# Patient Record
Sex: MALE | Race: WHITE | NOT HISPANIC OR LATINO | Employment: UNEMPLOYED | URBAN - METROPOLITAN AREA
[De-identification: names, ages, dates, MRNs, and addresses within clinical notes are randomized per-mention and may not be internally consistent; named-entity substitution may affect disease eponyms.]

---

## 2019-09-08 ENCOUNTER — HOSPITAL ENCOUNTER (EMERGENCY)
Facility: OTHER | Age: 24
Discharge: HOME | End: 2019-09-08
Attending: EMERGENCY MEDICINE
Payer: COMMERCIAL

## 2019-09-08 VITALS
OXYGEN SATURATION: 100 % | HEIGHT: 69 IN | HEART RATE: 102 BPM | TEMPERATURE: 98 F | DIASTOLIC BLOOD PRESSURE: 76 MMHG | BODY MASS INDEX: 21.48 KG/M2 | WEIGHT: 145 LBS | SYSTOLIC BLOOD PRESSURE: 134 MMHG | RESPIRATION RATE: 15 BRPM

## 2019-09-08 DIAGNOSIS — R07.9 CHEST PAIN: ICD-10-CM

## 2019-09-08 DIAGNOSIS — B34.9 VIRAL SYNDROME: ICD-10-CM

## 2019-09-08 DIAGNOSIS — R11.2 NON-INTRACTABLE VOMITING WITH NAUSEA, UNSPECIFIED VOMITING TYPE: Primary | ICD-10-CM

## 2019-09-08 LAB
BACTERIA #/AREA URNS HPF: NORMAL /HPF
BILIRUB UR QL STRIP: ABNORMAL
CLARITY UR: CLEAR
COLOR UR: YELLOW
GLUCOSE UR QL STRIP: NEGATIVE
HGB UR QL STRIP: NEGATIVE
HYALINE CASTS #/AREA URNS LPF: 0 /LPF
KETONES UR QL STRIP: ABNORMAL
LEUKOCYTE ESTERASE UR QL STRIP: NEGATIVE
MICROSCOPIC COMMENT: NORMAL
NITRITE UR QL STRIP: NEGATIVE
PH UR STRIP: 8 [PH] (ref 5–8)
PROT UR QL STRIP: ABNORMAL
RBC #/AREA URNS HPF: 2 /HPF (ref 0–4)
SP GR UR STRIP: 1.01 (ref 1–1.03)
URN SPEC COLLECT METH UR: ABNORMAL
UROBILINOGEN UR STRIP-ACNC: ABNORMAL EU/DL
WBC #/AREA URNS HPF: 1 /HPF (ref 0–5)

## 2019-09-08 PROCEDURE — 63600175 PHARM REV CODE 636 W HCPCS: Performed by: EMERGENCY MEDICINE

## 2019-09-08 PROCEDURE — 81000 URINALYSIS NONAUTO W/SCOPE: CPT

## 2019-09-08 PROCEDURE — 96374 THER/PROPH/DIAG INJ IV PUSH: CPT

## 2019-09-08 PROCEDURE — 93010 EKG 12-LEAD: ICD-10-PCS | Mod: ,,, | Performed by: INTERNAL MEDICINE

## 2019-09-08 PROCEDURE — 93010 ELECTROCARDIOGRAM REPORT: CPT | Mod: ,,, | Performed by: INTERNAL MEDICINE

## 2019-09-08 PROCEDURE — 96361 HYDRATE IV INFUSION ADD-ON: CPT

## 2019-09-08 PROCEDURE — 93005 ELECTROCARDIOGRAM TRACING: CPT

## 2019-09-08 PROCEDURE — 99284 EMERGENCY DEPT VISIT MOD MDM: CPT | Mod: 25

## 2019-09-08 PROCEDURE — 25000003 PHARM REV CODE 250: Performed by: EMERGENCY MEDICINE

## 2019-09-08 RX ORDER — KETOROLAC TROMETHAMINE 30 MG/ML
15 INJECTION, SOLUTION INTRAMUSCULAR; INTRAVENOUS
Status: COMPLETED | OUTPATIENT
Start: 2019-09-08 | End: 2019-09-08

## 2019-09-08 RX ORDER — ACETAMINOPHEN 500 MG
1000 TABLET ORAL
Status: COMPLETED | OUTPATIENT
Start: 2019-09-08 | End: 2019-09-08

## 2019-09-08 RX ORDER — ONDANSETRON 8 MG/1
8 TABLET, ORALLY DISINTEGRATING ORAL
Status: COMPLETED | OUTPATIENT
Start: 2019-09-08 | End: 2019-09-08

## 2019-09-08 RX ADMIN — ONDANSETRON 8 MG: 8 TABLET, ORALLY DISINTEGRATING ORAL at 05:09

## 2019-09-08 RX ADMIN — KETOROLAC TROMETHAMINE 15 MG: 30 INJECTION, SOLUTION INTRAMUSCULAR at 05:09

## 2019-09-08 RX ADMIN — SODIUM CHLORIDE 1000 ML: 0.9 INJECTION, SOLUTION INTRAVENOUS at 05:09

## 2019-09-08 RX ADMIN — ALUMINUM HYDROXIDE, MAGNESIUM HYDROXIDE, DIMETHICONE 50 ML: 200; 200; 20 LIQUID ORAL at 05:09

## 2019-09-08 RX ADMIN — ACETAMINOPHEN 1000 MG: 500 TABLET ORAL at 05:09

## 2019-09-08 NOTE — ED TRIAGE NOTES
Patient reports to ED after drinking several drinks last night and then vomiting. Patient then began to have back and chest pains beginning around 0300 this am. Patient AAOx3, VSS, will continue to monitor closely.

## 2019-09-08 NOTE — ED PROVIDER NOTES
"Encounter Date: 9/8/2019    SCRIBE #1 NOTE: I, Gideon Felipe, am scribing for, and in the presence of,  Dr. Fishman. I have scribed the entire note.       History     Chief Complaint   Patient presents with    Back Pain     Midline lower back pain x today; + N/V reported; + pt admits to drinking excessively last PM; denies drug use, denies dysuria or difficulty urinating.      Time patient was seen by the provider: 5:08 PM      The patient is a 24 y.o. male who presents with  lower back pain since this morning. He states it feels like "stabbing between each vertebrate". He also reports sharp chest pain, nausea, and vomiting. He states he drank a lot of alcohol last night. He denies any drug use. He denies any other symptoms. No specific heavy exertion or trauma.       The history is provided by the patient.     Review of patient's allergies indicates:  No Known Allergies  History reviewed. No pertinent past medical history.  History reviewed. No pertinent surgical history.  History reviewed. No pertinent family history.  Social History     Tobacco Use    Smoking status: Current Every Day Smoker     Types: Vaping with nicotine   Substance Use Topics    Alcohol use: Yes     Comment: socially    Drug use: Not on file     Comment: denies currently     Review of Systems   Constitutional: Negative for chills and fever.   HENT: Negative for rhinorrhea and trouble swallowing.    Eyes: Negative for photophobia and visual disturbance.   Respiratory: Negative for cough and shortness of breath.    Cardiovascular: Positive for chest pain.   Gastrointestinal: Positive for nausea and vomiting.   Genitourinary: Negative for difficulty urinating and dysuria.   Musculoskeletal: Positive for back pain. Negative for neck pain.   Skin: Negative for pallor.   Neurological: Negative for tremors and speech difficulty.       Physical Exam     Initial Vitals [09/08/19 1632]   BP Pulse Resp Temp SpO2   (!) 137/95 102 16 97.8 °F (36.6 °C) " 97 %      MAP       --         Physical Exam    Nursing note and vitals reviewed.  Constitutional: He appears well-developed and well-nourished. He appears distressed.   Fatigued, uncomfortable, warm to touch   HENT:   Head: Normocephalic and atraumatic.   Eyes: Conjunctivae and EOM are normal. Pupils are equal, round, and reactive to light.   Neck: Normal range of motion. Neck supple.   Cardiovascular: Normal rate and normal heart sounds.   Pulmonary/Chest: Effort normal and breath sounds normal.   Abdominal: Soft. Normal appearance and bowel sounds are normal. There is no tenderness.   Musculoskeletal: Normal range of motion.   Diffuse B paraspinal tenderness, no midline vertebral tenderness, no saddle anesthesia   Neurological: He is alert and oriented to person, place, and time. He has normal strength. No cranial nerve deficit or sensory deficit.   Skin: Skin is warm and dry.   Psychiatric: He has a normal mood and affect. His behavior is normal. Thought content normal.         ED Course   Procedures  Labs Reviewed   URINALYSIS, REFLEX TO URINE CULTURE - Abnormal; Notable for the following components:       Result Value    Protein, UA 1+ (*)     Ketones, UA 1+ (*)     Bilirubin (UA) 1+ (*)     Urobilinogen, UA 2.0-3.0 (*)     All other components within normal limits    Narrative:     Preferred Collection Type->Urine, Clean Catch   URINALYSIS MICROSCOPIC    Narrative:     Preferred Collection Type->Urine, Clean Catch        ECG Results          EKG 12-lead (In process)  Result time 09/09/19 08:11:05    In process by Interface, Lab In [facility] (09/09/19 08:11:05)                 Narrative:    Test Reason : R07.9,    Vent. Rate : 091 BPM     Atrial Rate : 091 BPM     P-R Int : 128 ms          QRS Dur : 082 ms      QT Int : 364 ms       P-R-T Axes : 066 079 017 degrees     QTc Int : 447 ms    Normal sinus rhythm with sinus arrhythmia  Normal ECG      Referred By: [email or web address]   SELF           Confirmed By:                               EKG 12-LEAD (Final result)  Result time 09/09/19 12:53:39    Final result by Unknown User (09/09/19 12:53:39)                                Imaging Results    None          Medical Decision Making:   History:   Old Medical Records: I decided to obtain old medical records.  Initial Assessment:   25 yo M here sp heavy etoh intake overnight with complaints of fatigue, malaise and back pain. Pt initially mildy tachycardic but afebrile, non toxic appearing though fatigued. Ddx includes uti, dehydration, viral syndrome, msk discomfort, hangover. Will admin ivf, pbtain ua and reassess. The patient's back pain is likely a musculoskeletal strain. There are no signs of saddle anesthesia, incontinence, neurologic deficits, fevers, trauma or midline tenderness on history or physical to suggest cauda equina, infectious process, fracture or subluxation.   Clinical Tests:   Lab Tests: Ordered and Reviewed  Medical Tests: Ordered and Reviewed            Scribe Attestation:   Scribe #1: I performed the above scribed service and the documentation accurately describes the services I performed. I attest to the accuracy of the note.    Attending Attestation:           Physician Attestation for Scribe:  Physician Attestation Statement for Scribe #1: I, Dr. Fishman, reviewed documentation, as scribed by Gideon Felipe in my presence, and it is both accurate and complete.                    Clinical Impression:       ICD-10-CM ICD-9-CM   1. Non-intractable vomiting with nausea, unspecified vomiting type R11.2 787.01   2. Chest pain R07.9 786.50   3. Viral syndrome B34.9 079.99         Disposition:   Disposition: Discharged  Condition: Kavita Fishman MD  09/10/19 0891

## 2019-09-08 NOTE — ED NOTES
2 Patient identifiers, allergies, and medications verified.    LOC: Patient is awake, alert and oriented X3. Pt aware of environment with an appropriate affect and speaking appropriate.    APPEARANCE: Patient appears to be in discomfort, grimacing    SKIN: Skin warm and dry, normal skin turgor and moist mucus membranes; no rashes or lesions present. Skin Intact, No breakdown noted.    Musculoskeletal:  Patient complaining of chest and back pain since vomiting over night    RESPIRATORY: Airway is open and patent, unlabored, spontaneous bilateral respirations; normal effort and rate.     CARDIAC: Patient complaining of chest and back pain since vomiting over night    ABDOMEN: Soft and non-tender upon palpation, no distention noted.     PULSES: 2+; symmetrical in all 4 extremities    NEUROLOGIC: PERRLA, symmetrical facial expression; normal sensation to all 4 extremities.    Call light within reach, VSS, will continue to monitor.